# Patient Record
Sex: FEMALE | Race: WHITE | NOT HISPANIC OR LATINO | Employment: OTHER | URBAN - METROPOLITAN AREA
[De-identification: names, ages, dates, MRNs, and addresses within clinical notes are randomized per-mention and may not be internally consistent; named-entity substitution may affect disease eponyms.]

---

## 2023-09-26 PROBLEM — R60.0 BILATERAL LOWER EXTREMITY EDEMA: Status: ACTIVE | Noted: 2023-09-26

## 2023-09-26 PROBLEM — M17.0 OSTEOARTHRITIS OF BOTH KNEES: Status: ACTIVE | Noted: 2023-09-26

## 2023-09-26 PROBLEM — E78.5 HYPERLIPEMIA: Status: ACTIVE | Noted: 2023-09-26

## 2023-09-26 PROBLEM — N76.0 VAGINITIS: Status: ACTIVE | Noted: 2023-09-26

## 2023-09-26 PROBLEM — N94.9 VAGINAL BURNING: Status: ACTIVE | Noted: 2023-09-26

## 2023-09-26 PROBLEM — M62.838 MUSCLE SPASM: Status: ACTIVE | Noted: 2023-09-26

## 2023-09-26 PROBLEM — E55.9 VITAMIN D INSUFFICIENCY: Status: ACTIVE | Noted: 2023-09-26

## 2023-09-26 PROBLEM — L30.9 CHRONIC DERMATITIS: Status: ACTIVE | Noted: 2023-09-26

## 2023-09-26 PROBLEM — I87.8 CHRONIC VENOUS STASIS: Status: ACTIVE | Noted: 2023-09-26

## 2023-09-26 PROBLEM — N76.0 BACTERIAL VAGINOSIS: Status: ACTIVE | Noted: 2023-09-26

## 2023-09-26 PROBLEM — S09.90XA HEAD INJURY: Status: ACTIVE | Noted: 2023-09-26

## 2023-09-26 PROBLEM — E05.00 GRAVES DISEASE: Status: ACTIVE | Noted: 2023-09-26

## 2023-09-26 PROBLEM — I87.2 VENOUS INSUFFICIENCY: Status: ACTIVE | Noted: 2023-09-26

## 2023-09-26 PROBLEM — C50.919 BREAST CANCER (MULTI): Status: ACTIVE | Noted: 2023-09-26

## 2023-09-26 PROBLEM — R51.9 HEADACHE: Status: ACTIVE | Noted: 2023-09-26

## 2023-09-26 PROBLEM — R42 DIZZINESS: Status: ACTIVE | Noted: 2023-09-26

## 2023-09-26 PROBLEM — E66.9 OBESITY (BMI 30-39.9): Status: ACTIVE | Noted: 2023-09-26

## 2023-09-26 PROBLEM — B35.6 TINEA CRURIS: Status: ACTIVE | Noted: 2023-09-26

## 2023-09-26 PROBLEM — I87.309 CHRONIC PERIPHERAL VENOUS HYPERTENSION: Status: ACTIVE | Noted: 2023-09-26

## 2023-09-26 PROBLEM — G25.0 ESSENTIAL TREMOR: Status: ACTIVE | Noted: 2023-09-26

## 2023-09-26 PROBLEM — B96.89 BACTERIAL VAGINOSIS: Status: ACTIVE | Noted: 2023-09-26

## 2023-09-26 PROBLEM — N94.89 VAGINAL BURNING: Status: ACTIVE | Noted: 2023-09-26

## 2023-09-26 PROBLEM — N95.2 ATROPHIC VAGINITIS: Status: ACTIVE | Noted: 2023-09-26

## 2023-09-26 PROBLEM — N89.8 VAGINAL ITCHING: Status: ACTIVE | Noted: 2023-09-26
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
## 2023-09-26 PROBLEM — I10 HYPERTENSION: Status: ACTIVE | Noted: 2023-09-26

## 2023-09-26 PROBLEM — R30.0 DYSURIA: Status: ACTIVE | Noted: 2023-09-26

## 2023-09-26 PROBLEM — B37.2 YEAST DERMATITIS: Status: ACTIVE | Noted: 2023-09-26

## 2023-09-26 PROBLEM — R42 LOSS OF EQUILIBRIUM: Status: ACTIVE | Noted: 2023-09-26

## 2023-09-26 RX ORDER — METOPROLOL SUCCINATE 50 MG/1
1 TABLET, EXTENDED RELEASE ORAL DAILY
COMMUNITY
Start: 2022-01-27 | End: 2024-01-25 | Stop reason: SDUPTHER

## 2023-09-26 RX ORDER — CHOLECALCIFEROL (VITAMIN D3) 25 MCG
TABLET ORAL
COMMUNITY

## 2023-09-26 RX ORDER — LISINOPRIL 5 MG/1
1 TABLET ORAL DAILY
COMMUNITY
Start: 2022-03-06 | End: 2024-01-03 | Stop reason: SDUPTHER

## 2023-09-26 RX ORDER — SIMVASTATIN 40 MG/1
40 TABLET, FILM COATED ORAL NIGHTLY
COMMUNITY
Start: 2022-02-25 | End: 2024-03-14 | Stop reason: SDUPTHER

## 2023-09-26 RX ORDER — MELOXICAM 7.5 MG/1
7.5 TABLET ORAL
COMMUNITY
Start: 2023-01-23

## 2023-09-26 RX ORDER — OXYCODONE AND ACETAMINOPHEN 5; 325 MG/1; MG/1
1 TABLET ORAL EVERY 6 HOURS PRN
COMMUNITY
End: 2023-11-21 | Stop reason: ALTCHOICE

## 2023-10-04 ENCOUNTER — APPOINTMENT (OUTPATIENT)
Dept: PRIMARY CARE | Facility: CLINIC | Age: 83
End: 2023-10-04
Payer: MEDICARE

## 2023-11-21 ENCOUNTER — OFFICE VISIT (OUTPATIENT)
Dept: PRIMARY CARE | Facility: CLINIC | Age: 83
End: 2023-11-21
Payer: MEDICARE

## 2023-11-21 VITALS
OXYGEN SATURATION: 97 % | WEIGHT: 174.8 LBS | HEART RATE: 64 BPM | TEMPERATURE: 97.5 F | BODY MASS INDEX: 30.97 KG/M2 | SYSTOLIC BLOOD PRESSURE: 128 MMHG | HEIGHT: 63 IN | DIASTOLIC BLOOD PRESSURE: 80 MMHG

## 2023-11-21 DIAGNOSIS — M75.01 ADHESIVE CAPSULITIS OF RIGHT SHOULDER: Primary | ICD-10-CM

## 2023-11-21 DIAGNOSIS — M25.511 ACUTE PAIN OF RIGHT SHOULDER: ICD-10-CM

## 2023-11-21 PROCEDURE — 3074F SYST BP LT 130 MM HG: CPT | Performed by: INTERNAL MEDICINE

## 2023-11-21 PROCEDURE — 3079F DIAST BP 80-89 MM HG: CPT | Performed by: INTERNAL MEDICINE

## 2023-11-21 PROCEDURE — 1036F TOBACCO NON-USER: CPT | Performed by: INTERNAL MEDICINE

## 2023-11-21 PROCEDURE — 99214 OFFICE O/P EST MOD 30 MIN: CPT | Performed by: INTERNAL MEDICINE

## 2023-11-21 RX ORDER — ANASTROZOLE 1 MG/1
1 TABLET ORAL DAILY
COMMUNITY
Start: 2023-08-24

## 2023-11-21 ASSESSMENT — ENCOUNTER SYMPTOMS
DEPRESSION: 0
LOSS OF SENSATION IN FEET: 0
OCCASIONAL FEELINGS OF UNSTEADINESS: 0

## 2023-11-21 ASSESSMENT — PATIENT HEALTH QUESTIONNAIRE - PHQ9
2. FEELING DOWN, DEPRESSED OR HOPELESS: NOT AT ALL
SUM OF ALL RESPONSES TO PHQ9 QUESTIONS 1 AND 2: 0
1. LITTLE INTEREST OR PLEASURE IN DOING THINGS: NOT AT ALL

## 2023-11-24 ASSESSMENT — ENCOUNTER SYMPTOMS
JOINT SWELLING: 0
PALPITATIONS: 0
FEVER: 0
CHEST TIGHTNESS: 0
LIGHT-HEADEDNESS: 0
EYE REDNESS: 0
HEMATURIA: 0
ACTIVITY CHANGE: 0
SPEECH DIFFICULTY: 0
BLOOD IN STOOL: 0
STRIDOR: 0

## 2023-11-24 NOTE — PROGRESS NOTES
"CHIEF COMPLAINT:    Dominick Peralta is a 83 y.o. female who presents for Arm Pain (PATIENT HERE FOR C/O RIGHT ARM PAIN X 1 MONTH. ).    HISTORY OF PRESENT ILLNESS:  Dominick Peralta  is a pleasant 83-year-old lady has right shoulder pain.  She cannot do any overhead activity.  This pain has been there for some time.  Patient does not recall but she is with her  who mention that she had a fall about a month ago.  She had injury to her out stretched hand.  Thereafter patient started recalling the pain started since then.  She is not moving her right shoulder a lot.  She has tenderness.  She denies any tingling numbness sensation.  She does not have any problem elbow or wrist joint.    Arm Pain   Pertinent negatives include no chest pain.       Review of Systems   Constitutional:  Negative for activity change and fever.   HENT:  Negative for hearing loss, nosebleeds and tinnitus.    Eyes:  Negative for redness.   Respiratory:  Negative for chest tightness and stridor.    Cardiovascular:  Negative for chest pain, palpitations and leg swelling.   Gastrointestinal:  Negative for blood in stool.   Endocrine: Negative for cold intolerance.   Genitourinary:  Negative for hematuria.   Musculoskeletal:  Negative for joint swelling.   Skin:  Negative for rash.   Neurological:  Negative for speech difficulty and light-headedness.   Psychiatric/Behavioral:  Negative for behavioral problems.      Visit Vitals  /80 (BP Location: Left arm, Patient Position: Sitting, BP Cuff Size: Adult)   Pulse 64   Temp 36.4 °C (97.5 °F) (Temporal)   Ht 1.6 m (5' 3\")   Wt 79.3 kg (174 lb 12.8 oz)   SpO2 97%   BMI 30.96 kg/m²   Smoking Status Never   BSA 1.88 m²         Wt Readings from Last 10 Encounters:   11/21/23 79.3 kg (174 lb 12.8 oz)   06/30/23 81.9 kg (180 lb 8 oz)   04/28/23 80.7 kg (178 lb)   02/02/23 82.1 kg (181 lb)   01/23/23 80.3 kg (177 lb)   11/11/22 83.5 kg (184 lb)   06/22/22 86.6 kg (191 lb)   04/25/22 87.1 kg (192 " lb)   04/19/22 87.1 kg (192 lb)   04/18/22 86.8 kg (191 lb 6 oz)       Physical Exam  Constitutional:       General: She is not in acute distress.     Appearance: Normal appearance.   HENT:      Head: Normocephalic.      Right Ear: Tympanic membrane normal.      Left Ear: Tympanic membrane normal.      Mouth/Throat:      Mouth: Mucous membranes are moist.   Cardiovascular:      Rate and Rhythm: Normal rate and regular rhythm.      Heart sounds: No murmur heard.  Pulmonary:      Effort: No respiratory distress.   Abdominal:      Palpations: Abdomen is soft.   Musculoskeletal:      Cervical back: Neck supple.      Right lower leg: No edema.      Left lower leg: No edema.   Skin:     Findings: No rash.   Neurological:      General: No focal deficit present.      Mental Status: She is alert and oriented to person, place, and time.   Psychiatric:         Mood and Affect: Mood normal.        RECENT LABS:  Lab Results   Component Value Date    WBC 8.0 11/17/2021    HGB 14.0 11/17/2021    HCT 44.8 11/17/2021     11/17/2021    CHOL 163 11/17/2021    TRIG 97 11/17/2021    HDL 56.0 11/17/2021    ALT 15 11/17/2021    AST 18 11/17/2021     11/17/2021    K 4.4 11/17/2021     11/17/2021    CREATININE 0.49 (L) 11/17/2021    BUN 14 11/17/2021    CO2 31 11/17/2021    TSH 1.35 11/17/2021       MEDICATIONS:   Current Outpatient Medications   Medication Instructions    anastrozole (ARIMIDEX) 1 mg, oral, Daily    cholecalciferol (Vitamin D-3) 25 MCG (1000 UT) tablet oral    lisinopril 5 mg tablet 1 tablet, oral, Daily    meloxicam (MOBIC) 7.5 mg, oral    metoprolol succinate XL (Toprol-XL) 50 mg 24 hr tablet 1 tablet, oral, Daily    simvastatin (ZOCOR) 40 mg, oral, Nightly      TODAY'S VISIT  DX:   1. Adhesive capsulitis of right shoulder  Referral to Physical Therapy      2. Acute pain of right shoulder           MEDICAL DECISION MAKING:  - Recent lab work and relevant imaging studies have been reviewed.    - Relevant  correspondence/notes from specialty consultants were reviewed and discussed with patient.    - The current active medical co morbidities have been considered.   - Patient will continue with current medical therapy and plan.   - Medication have been sent for refill.    - Next Follow up in 3 months.

## 2024-01-03 ENCOUNTER — OFFICE VISIT (OUTPATIENT)
Dept: PRIMARY CARE | Facility: CLINIC | Age: 84
End: 2024-01-03
Payer: MEDICARE

## 2024-01-03 VITALS
BODY MASS INDEX: 31.25 KG/M2 | DIASTOLIC BLOOD PRESSURE: 66 MMHG | OXYGEN SATURATION: 97 % | HEART RATE: 76 BPM | HEIGHT: 63 IN | WEIGHT: 176.38 LBS | TEMPERATURE: 97.6 F | SYSTOLIC BLOOD PRESSURE: 140 MMHG

## 2024-01-03 DIAGNOSIS — R30.0 DYSURIA: ICD-10-CM

## 2024-01-03 DIAGNOSIS — I10 PRIMARY HYPERTENSION: ICD-10-CM

## 2024-01-03 DIAGNOSIS — C50.119 MALIGNANT NEOPLASM OF CENTRAL PORTION OF FEMALE BREAST, UNSPECIFIED ESTROGEN RECEPTOR STATUS, UNSPECIFIED LATERALITY (MULTI): ICD-10-CM

## 2024-01-03 DIAGNOSIS — N39.490 OVERFLOW INCONTINENCE OF URINE: Primary | ICD-10-CM

## 2024-01-03 PROBLEM — I87.8 CHRONIC VENOUS STASIS: Status: RESOLVED | Noted: 2023-09-26 | Resolved: 2024-01-03
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
## 2024-01-03 PROBLEM — I87.2 VENOUS INSUFFICIENCY: Status: RESOLVED | Noted: 2023-09-26 | Resolved: 2024-01-03

## 2024-01-03 PROBLEM — B37.2 YEAST DERMATITIS: Status: RESOLVED | Noted: 2023-09-26 | Resolved: 2024-01-03

## 2024-01-03 PROBLEM — R42 DIZZINESS: Status: RESOLVED | Noted: 2023-09-26 | Resolved: 2024-01-03

## 2024-01-03 PROBLEM — S09.90XA HEAD INJURY: Status: RESOLVED | Noted: 2023-09-26 | Resolved: 2024-01-03

## 2024-01-03 PROBLEM — R51.9 HEADACHE: Status: RESOLVED | Noted: 2023-09-26 | Resolved: 2024-01-03

## 2024-01-03 PROBLEM — N94.89 VAGINAL BURNING: Status: RESOLVED | Noted: 2023-09-26 | Resolved: 2024-01-03

## 2024-01-03 PROBLEM — N94.9 VAGINAL BURNING: Status: RESOLVED | Noted: 2023-09-26 | Resolved: 2024-01-03

## 2024-01-03 PROBLEM — B35.6 TINEA CRURIS: Status: RESOLVED | Noted: 2023-09-26 | Resolved: 2024-01-03

## 2024-01-03 PROBLEM — N76.0 VAGINITIS: Status: RESOLVED | Noted: 2023-09-26 | Resolved: 2024-01-03

## 2024-01-03 PROBLEM — N76.0 BACTERIAL VAGINOSIS: Status: RESOLVED | Noted: 2023-09-26 | Resolved: 2024-01-03

## 2024-01-03 PROBLEM — R42 LOSS OF EQUILIBRIUM: Status: RESOLVED | Noted: 2023-09-26 | Resolved: 2024-01-03

## 2024-01-03 PROBLEM — L30.9 CHRONIC DERMATITIS: Status: RESOLVED | Noted: 2023-09-26 | Resolved: 2024-01-03

## 2024-01-03 PROBLEM — B96.89 BACTERIAL VAGINOSIS: Status: RESOLVED | Noted: 2023-09-26 | Resolved: 2024-01-03

## 2024-01-03 PROBLEM — M62.838 MUSCLE SPASM: Status: RESOLVED | Noted: 2023-09-26 | Resolved: 2024-01-03

## 2024-01-03 PROBLEM — N89.8 VAGINAL ITCHING: Status: RESOLVED | Noted: 2023-09-26 | Resolved: 2024-01-03

## 2024-01-03 PROCEDURE — 3078F DIAST BP <80 MM HG: CPT | Performed by: INTERNAL MEDICINE

## 2024-01-03 PROCEDURE — 1159F MED LIST DOCD IN RCRD: CPT | Performed by: INTERNAL MEDICINE

## 2024-01-03 PROCEDURE — 1036F TOBACCO NON-USER: CPT | Performed by: INTERNAL MEDICINE

## 2024-01-03 PROCEDURE — 99214 OFFICE O/P EST MOD 30 MIN: CPT | Performed by: INTERNAL MEDICINE

## 2024-01-03 PROCEDURE — 3077F SYST BP >= 140 MM HG: CPT | Performed by: INTERNAL MEDICINE

## 2024-01-03 RX ORDER — LISINOPRIL 5 MG/1
5 TABLET ORAL DAILY
Qty: 90 TABLET | Refills: 1 | Status: SHIPPED | OUTPATIENT
Start: 2024-01-03

## 2024-01-03 RX ORDER — MIRABEGRON 50 MG/1
50 TABLET, EXTENDED RELEASE ORAL DAILY
Start: 2024-01-03 | End: 2024-01-30 | Stop reason: SDUPTHER

## 2024-01-03 ASSESSMENT — ENCOUNTER SYMPTOMS
CHEST TIGHTNESS: 0
JOINT SWELLING: 0
EYE REDNESS: 0
ACTIVITY CHANGE: 0
BLOOD IN STOOL: 0
LIGHT-HEADEDNESS: 0
FEVER: 0
PALPITATIONS: 0
HEMATURIA: 0
STRIDOR: 0
SPEECH DIFFICULTY: 0

## 2024-01-03 ASSESSMENT — PATIENT HEALTH QUESTIONNAIRE - PHQ9: 1. LITTLE INTEREST OR PLEASURE IN DOING THINGS: NOT AT ALL

## 2024-01-04 NOTE — PROGRESS NOTES
"CHIEF COMPLAINT:    Dominick Peralta is a 83 y.o. female who presents for bladder control (Patient in office today for bladder control issues. ).    HISTORY OF PRESENT ILLNESS:  Dominick Peralta  is a pleasant 83-year-old female comes here with her .  She has been having incontinence.  She needs to go to the bathroom right away otherwise she has accidents.  This has been going on for some time.  Patient does have history of atrophic vaginitis with vaginal itchiness and burning sensation.  She is a breast cancer survivor.  She also suffers from hypertension.  Patient denies any chills and rigors.  She does not have any nausea or vomiting.  She does not think this is any UTI.  She does not use adult diapers as such however quite a few times she had accidents..        Review of Systems   Constitutional:  Negative for activity change and fever.   HENT:  Negative for hearing loss, nosebleeds and tinnitus.    Eyes:  Negative for redness.   Respiratory:  Negative for chest tightness and stridor.    Cardiovascular:  Negative for chest pain, palpitations and leg swelling.   Gastrointestinal:  Negative for blood in stool.   Endocrine: Negative for cold intolerance.   Genitourinary:  Negative for hematuria.   Musculoskeletal:  Negative for joint swelling.   Skin:  Negative for rash.   Neurological:  Negative for speech difficulty and light-headedness.   Psychiatric/Behavioral:  Negative for behavioral problems.      Visit Vitals  /66 (BP Location: Left arm, Patient Position: Sitting, BP Cuff Size: Adult)   Pulse 76   Temp 36.4 °C (97.6 °F) (Temporal)   Ht 1.6 m (5' 3\")   Wt 80 kg (176 lb 6 oz)   SpO2 97%   BMI 31.24 kg/m²   Smoking Status Never   BSA 1.89 m²         Wt Readings from Last 10 Encounters:   01/03/24 80 kg (176 lb 6 oz)   11/21/23 79.3 kg (174 lb 12.8 oz)   06/30/23 81.9 kg (180 lb 8 oz)   04/28/23 80.7 kg (178 lb)   02/02/23 82.1 kg (181 lb)   01/23/23 80.3 kg (177 lb)   11/11/22 83.5 kg (184 lb) "   06/22/22 86.6 kg (191 lb)   04/25/22 87.1 kg (192 lb)   04/19/22 87.1 kg (192 lb)       Physical Exam  Constitutional:       General: She is not in acute distress.     Appearance: Normal appearance.   HENT:      Head: Normocephalic.      Right Ear: Tympanic membrane normal.      Left Ear: Tympanic membrane normal.      Mouth/Throat:      Mouth: Mucous membranes are moist.   Cardiovascular:      Rate and Rhythm: Normal rate and regular rhythm.      Heart sounds: No murmur heard.  Pulmonary:      Effort: No respiratory distress.   Abdominal:      Palpations: Abdomen is soft.   Musculoskeletal:      Cervical back: Neck supple.      Right lower leg: No edema.      Left lower leg: No edema.   Skin:     Findings: No rash.   Neurological:      General: No focal deficit present.      Mental Status: She is alert and oriented to person, place, and time.   Psychiatric:         Mood and Affect: Mood normal.        RECENT LABS:  Lab Results   Component Value Date    WBC 8.0 11/17/2021    HGB 14.0 11/17/2021    HCT 44.8 11/17/2021     11/17/2021    CHOL 163 11/17/2021    TRIG 97 11/17/2021    HDL 56.0 11/17/2021    ALT 15 11/17/2021    AST 18 11/17/2021     11/17/2021    K 4.4 11/17/2021     11/17/2021    CREATININE 0.49 (L) 11/17/2021    BUN 14 11/17/2021    CO2 31 11/17/2021    TSH 1.35 11/17/2021     IMAGING:  Reviewed:   MEDICATIONS:   Current Outpatient Medications   Medication Instructions    anastrozole (ARIMIDEX) 1 mg, oral, Daily    cholecalciferol (Vitamin D-3) 25 MCG (1000 UT) tablet oral    lisinopril 5 mg, oral, Daily    meloxicam (MOBIC) 7.5 mg, oral    metoprolol succinate XL (Toprol-XL) 50 mg 24 hr tablet 1 tablet, oral, Daily    simvastatin (ZOCOR) 40 mg, oral, Nightly      TODAY'S VISIT  DX:   1. Overflow incontinence of urine  mirabegron (Myrbetriq) 50 mg tablet extended release 24 hr 24 hr tablet      2. Primary hypertension  lisinopril 5 mg tablet      3. Malignant neoplasm of central  portion of female breast, unspecified estrogen receptor status, unspecified laterality (CMS/HCC)        4. Dysuria           MEDICAL DECISION MAKING:  - Recent lab work and relevant imaging studies have been reviewed.    - The current active medical co morbidities have been considered.   - Relevant correspondence/notes from specialty consultants were reviewed and discussed with patient.    - Patient was given treatment as per above plan.  Patient was given some physician samples of Marbetriq.   - Patient will continue with current medical therapy and plan.   - Medication have been sent for refill.    - Next Follow up 1 to 2 months.

## 2024-01-25 DIAGNOSIS — I10 HYPERTENSION, UNSPECIFIED TYPE: ICD-10-CM

## 2024-01-28 RX ORDER — METOPROLOL SUCCINATE 50 MG/1
50 TABLET, EXTENDED RELEASE ORAL DAILY
Qty: 90 TABLET | Refills: 1 | Status: SHIPPED | OUTPATIENT
Start: 2024-01-28

## 2024-01-30 ENCOUNTER — OFFICE VISIT (OUTPATIENT)
Dept: PRIMARY CARE | Facility: CLINIC | Age: 84
End: 2024-01-30
Payer: MEDICARE

## 2024-01-30 VITALS
OXYGEN SATURATION: 97 % | WEIGHT: 177.8 LBS | TEMPERATURE: 97.2 F | HEIGHT: 63 IN | BODY MASS INDEX: 31.5 KG/M2 | HEART RATE: 73 BPM | DIASTOLIC BLOOD PRESSURE: 60 MMHG | SYSTOLIC BLOOD PRESSURE: 110 MMHG

## 2024-01-30 DIAGNOSIS — H35.3213 EXUDATIVE AGE-RELATED MACULAR DEGENERATION, RIGHT EYE, WITH INACTIVE SCAR (MULTI): ICD-10-CM

## 2024-01-30 DIAGNOSIS — N39.490 OVERFLOW INCONTINENCE OF URINE: ICD-10-CM

## 2024-01-30 DIAGNOSIS — N95.2 ATROPHIC VAGINITIS: ICD-10-CM

## 2024-01-30 DIAGNOSIS — N39.498 FREQUENT URINARY INCONTINENCE: Primary | ICD-10-CM

## 2024-01-30 PROBLEM — R60.0 BILATERAL LOWER EXTREMITY EDEMA: Status: RESOLVED | Noted: 2023-09-26 | Resolved: 2024-01-30

## 2024-01-30 PROBLEM — E05.00 GRAVES DISEASE: Status: RESOLVED | Noted: 2023-09-26 | Resolved: 2024-01-30

## 2024-01-30 PROBLEM — R30.0 DYSURIA: Status: RESOLVED | Noted: 2023-09-26 | Resolved: 2024-01-30

## 2024-01-30 PROCEDURE — 99214 OFFICE O/P EST MOD 30 MIN: CPT | Performed by: INTERNAL MEDICINE

## 2024-01-30 PROCEDURE — 1036F TOBACCO NON-USER: CPT | Performed by: INTERNAL MEDICINE

## 2024-01-30 PROCEDURE — 1159F MED LIST DOCD IN RCRD: CPT | Performed by: INTERNAL MEDICINE

## 2024-01-30 PROCEDURE — 3074F SYST BP LT 130 MM HG: CPT | Performed by: INTERNAL MEDICINE

## 2024-01-30 PROCEDURE — 3078F DIAST BP <80 MM HG: CPT | Performed by: INTERNAL MEDICINE

## 2024-01-30 RX ORDER — MIRABEGRON 50 MG/1
50 TABLET, EXTENDED RELEASE ORAL DAILY
Qty: 90 TABLET | Refills: 1 | Status: SHIPPED | OUTPATIENT
Start: 2024-01-30 | End: 2025-01-29

## 2024-01-30 ASSESSMENT — ENCOUNTER SYMPTOMS
DEPRESSION: 0
OCCASIONAL FEELINGS OF UNSTEADINESS: 0
LOSS OF SENSATION IN FEET: 0

## 2024-01-30 ASSESSMENT — PATIENT HEALTH QUESTIONNAIRE - PHQ9
SUM OF ALL RESPONSES TO PHQ9 QUESTIONS 1 AND 2: 0
2. FEELING DOWN, DEPRESSED OR HOPELESS: NOT AT ALL
1. LITTLE INTEREST OR PLEASURE IN DOING THINGS: NOT AT ALL

## 2024-02-12 DIAGNOSIS — B37.2 CUTANEOUS CANDIDIASIS: ICD-10-CM

## 2024-02-12 DIAGNOSIS — L03.317 CELLULITIS OF MULTIPLE SITES OF BUTTOCK: Primary | ICD-10-CM

## 2024-02-12 RX ORDER — CLOTRIMAZOLE AND BETAMETHASONE DIPROPIONATE 10; .64 MG/G; MG/G
1 CREAM TOPICAL 4 TIMES DAILY
COMMUNITY
End: 2024-02-12 | Stop reason: SDUPTHER

## 2024-02-12 RX ORDER — CLOTRIMAZOLE AND BETAMETHASONE DIPROPIONATE 10; .64 MG/G; MG/G
CREAM TOPICAL 4 TIMES DAILY
Qty: 45 G | Refills: 1 | Status: SHIPPED | OUTPATIENT
Start: 2024-02-12 | End: 2024-04-24 | Stop reason: SDUPTHER

## 2024-02-12 NOTE — TELEPHONE ENCOUNTER
stated that the co-pay on the [facility] is $283.  is asking if something else could be sent. Please advise.

## 2024-02-14 ASSESSMENT — ENCOUNTER SYMPTOMS
STRIDOR: 0
SPEECH DIFFICULTY: 0
JOINT SWELLING: 0
PALPITATIONS: 0
ACTIVITY CHANGE: 0
BLOOD IN STOOL: 0
LIGHT-HEADEDNESS: 0
EYE REDNESS: 0
HEMATURIA: 0
FEVER: 0
CHEST TIGHTNESS: 0

## 2024-02-14 NOTE — PROGRESS NOTES
"CHIEF COMPLAINT:    Dominick Peralta is a 83 y.o. female who presents for Med Management (Patient is in office today to go over medications).    HISTORY OF PRESENT ILLNESS:  Dominick Peralta  is a pleasant 83-year-old female who has urinary incontinence.  We had some physician samples for mirabegron.  Patient is here today in the office with her .  She got positive result on this medication.  She needs prescription.  Patient also has dry vaginal skin, irritation and also itching sensation.  She denies any dysuria or hesitancy.  Otherwise she does not have any acute medical complaint.  Her chronic medical conditions are stable at baseline.  She is a breast cancer survivor        Review of Systems   Constitutional:  Negative for activity change and fever.   HENT:  Negative for hearing loss, nosebleeds and tinnitus.    Eyes:  Negative for redness.   Respiratory:  Negative for chest tightness and stridor.    Cardiovascular:  Negative for chest pain, palpitations and leg swelling.   Gastrointestinal:  Negative for blood in stool.   Endocrine: Negative for cold intolerance.   Genitourinary:  Negative for hematuria.   Musculoskeletal:  Negative for joint swelling.   Skin:  Negative for rash.   Neurological:  Negative for speech difficulty and light-headedness.   Psychiatric/Behavioral:  Negative for behavioral problems.      Visit Vitals  /60 (BP Location: Left arm, Patient Position: Sitting, BP Cuff Size: Large adult)   Pulse 73   Temp 36.2 °C (97.2 °F) (Temporal)   Ht 1.6 m (5' 3\")   Wt 80.6 kg (177 lb 12.8 oz)   SpO2 97% Comment: RA   BMI 31.50 kg/m²   Smoking Status Never   BSA 1.89 m²         Wt Readings from Last 10 Encounters:   01/30/24 80.6 kg (177 lb 12.8 oz)   01/03/24 80 kg (176 lb 6 oz)   11/21/23 79.3 kg (174 lb 12.8 oz)   06/30/23 81.9 kg (180 lb 8 oz)   04/28/23 80.7 kg (178 lb)   02/02/23 82.1 kg (181 lb)   01/23/23 80.3 kg (177 lb)   11/11/22 83.5 kg (184 lb)   06/22/22 86.6 kg (191 lb) "   04/25/22 87.1 kg (192 lb)       Physical Exam  Constitutional:       General: She is not in acute distress.     Appearance: Normal appearance.   HENT:      Head: Normocephalic.      Right Ear: Tympanic membrane normal.      Left Ear: Tympanic membrane normal.      Mouth/Throat:      Mouth: Mucous membranes are moist.   Cardiovascular:      Rate and Rhythm: Normal rate and regular rhythm.      Heart sounds: No murmur heard.  Pulmonary:      Effort: No respiratory distress.   Abdominal:      Palpations: Abdomen is soft.   Musculoskeletal:      Cervical back: Neck supple.      Right lower leg: No edema.      Left lower leg: No edema.   Skin:     Findings: No rash.   Neurological:      General: No focal deficit present.      Mental Status: She is alert and oriented to person, place, and time.   Psychiatric:         Mood and Affect: Mood normal.        RECENT LABS:  Lab Results   Component Value Date    WBC 8.0 11/17/2021    HGB 14.0 11/17/2021    HCT 44.8 11/17/2021     11/17/2021    CHOL 163 11/17/2021    TRIG 97 11/17/2021    HDL 56.0 11/17/2021    ALT 15 11/17/2021    AST 18 11/17/2021     11/17/2021    K 4.4 11/17/2021     11/17/2021    CREATININE 0.49 (L) 11/17/2021    BUN 14 11/17/2021    CO2 31 11/17/2021    TSH 1.35 11/17/2021     IMAGING:  === 02/10/20 ===    - Impression -  1. No evidence of acute cardiopulmonary process.   === 01/18/21 ===    CT HEAD WO IV CONTRAST    - Impression -  No definite acute cortical infarct or intracranial hemorrhage.  Mild nonspecific deep and subcortical white matter disease.  Sinus disease as above.     MEDICATIONS:   Current Outpatient Medications   Medication Instructions    anastrozole (ARIMIDEX) 1 mg, oral, Daily    cholecalciferol (Vitamin D-3) 25 MCG (1000 UT) tablet oral    clotrimazole-betamethasone (Lotrisone) cream Topical, 4 times daily    estrogens (conjugated) (PREMARIN) 1 g, vaginal, Daily    lisinopril 5 mg, oral, Daily    meloxicam (MOBIC)  7.5 mg, oral    metoprolol succinate XL (TOPROL-XL) 50 mg, oral, Daily    mirabegron (MYRBETRIQ) 50 mg, oral, Daily    simvastatin (ZOCOR) 40 mg, oral, Nightly      TODAY'S VISIT  DX:   1. Frequent urinary incontinence        2. Exudative age-related macular degeneration, right eye, with inactive scar (CMS/HCC)        3. Overflow incontinence of urine  mirabegron (Myrbetriq) 50 mg tablet extended release 24 hr 24 hr tablet      4. Atrophic vaginitis  estrogens, conjugated, (Premarin) vaginal cream         MEDICAL DECISION MAKING:  - Recent lab work and relevant imaging studies have been reviewed.    - The current active medical co morbidities have been considered.   - Relevant correspondence/notes from specialty consultants were reviewed and discussed with patient.    - Patient was given treatment as per above plan.   - Patient will continue with current medical therapy and plan.   - Medication have been sent for refill.    - Next Follow up in 3 months.

## 2024-03-14 DIAGNOSIS — E78.5 HYPERLIPIDEMIA, UNSPECIFIED HYPERLIPIDEMIA TYPE: ICD-10-CM

## 2024-03-14 RX ORDER — SIMVASTATIN 40 MG/1
40 TABLET, FILM COATED ORAL NIGHTLY
Qty: 90 TABLET | Refills: 1 | Status: SHIPPED | OUTPATIENT
Start: 2024-03-14

## 2024-04-24 ENCOUNTER — OFFICE VISIT (OUTPATIENT)
Dept: PRIMARY CARE | Facility: CLINIC | Age: 84
End: 2024-04-24
Payer: MEDICARE

## 2024-04-24 ENCOUNTER — APPOINTMENT (OUTPATIENT)
Dept: PRIMARY CARE | Facility: CLINIC | Age: 84
End: 2024-04-24
Payer: MEDICARE

## 2024-04-24 VITALS
BODY MASS INDEX: 32.62 KG/M2 | TEMPERATURE: 97.7 F | DIASTOLIC BLOOD PRESSURE: 66 MMHG | HEIGHT: 63 IN | WEIGHT: 184.13 LBS | HEART RATE: 73 BPM | OXYGEN SATURATION: 98 % | SYSTOLIC BLOOD PRESSURE: 120 MMHG

## 2024-04-24 DIAGNOSIS — Z00.00 ENCOUNTER FOR SUBSEQUENT ANNUAL WELLNESS VISIT (AWV) IN MEDICARE PATIENT: Primary | ICD-10-CM

## 2024-04-24 DIAGNOSIS — B37.2 CUTANEOUS CANDIDIASIS: ICD-10-CM

## 2024-04-24 DIAGNOSIS — E55.9 VITAMIN D INSUFFICIENCY: ICD-10-CM

## 2024-04-24 DIAGNOSIS — E78.2 MIXED DYSLIPIDEMIA: ICD-10-CM

## 2024-04-24 DIAGNOSIS — I10 ESSENTIAL (PRIMARY) HYPERTENSION: ICD-10-CM

## 2024-04-24 PROCEDURE — 1170F FXNL STATUS ASSESSED: CPT | Performed by: INTERNAL MEDICINE

## 2024-04-24 PROCEDURE — 3078F DIAST BP <80 MM HG: CPT | Performed by: INTERNAL MEDICINE

## 2024-04-24 PROCEDURE — 3074F SYST BP LT 130 MM HG: CPT | Performed by: INTERNAL MEDICINE

## 2024-04-24 PROCEDURE — 1158F ADVNC CARE PLAN TLK DOCD: CPT | Performed by: INTERNAL MEDICINE

## 2024-04-24 PROCEDURE — G0439 PPPS, SUBSEQ VISIT: HCPCS | Performed by: INTERNAL MEDICINE

## 2024-04-24 PROCEDURE — 1159F MED LIST DOCD IN RCRD: CPT | Performed by: INTERNAL MEDICINE

## 2024-04-24 PROCEDURE — 1123F ACP DISCUSS/DSCN MKR DOCD: CPT | Performed by: INTERNAL MEDICINE

## 2024-04-24 ASSESSMENT — ACTIVITIES OF DAILY LIVING (ADL)
DOING_HOUSEWORK: INDEPENDENT
BATHING: INDEPENDENT
TAKING_MEDICATION: INDEPENDENT
MANAGING_FINANCES: INDEPENDENT
DRESSING: INDEPENDENT
GROCERY_SHOPPING: INDEPENDENT

## 2024-04-24 ASSESSMENT — ENCOUNTER SYMPTOMS
OCCASIONAL FEELINGS OF UNSTEADINESS: 0
DEPRESSION: 0
LOSS OF SENSATION IN FEET: 1

## 2024-04-24 ASSESSMENT — PATIENT HEALTH QUESTIONNAIRE - PHQ9
1. LITTLE INTEREST OR PLEASURE IN DOING THINGS: NOT AT ALL
SUM OF ALL RESPONSES TO PHQ9 QUESTIONS 1 AND 2: 0
2. FEELING DOWN, DEPRESSED OR HOPELESS: NOT AT ALL

## 2024-05-07 ENCOUNTER — LAB (OUTPATIENT)
Dept: LAB | Facility: LAB | Age: 84
End: 2024-05-07
Payer: MEDICARE

## 2024-05-07 DIAGNOSIS — E55.9 VITAMIN D INSUFFICIENCY: ICD-10-CM

## 2024-05-07 DIAGNOSIS — R39.9 URINARY TRACT INFECTION SYMPTOMS: ICD-10-CM

## 2024-05-07 DIAGNOSIS — Z00.00 ENCOUNTER FOR WELLNESS EXAMINATION: ICD-10-CM

## 2024-05-07 DIAGNOSIS — E78.2 MIXED DYSLIPIDEMIA: ICD-10-CM

## 2024-05-07 DIAGNOSIS — I10 ESSENTIAL (PRIMARY) HYPERTENSION: ICD-10-CM

## 2024-05-07 LAB
25(OH)D3 SERPL-MCNC: 62 NG/ML (ref 30–100)
ALBUMIN SERPL BCP-MCNC: 4.2 G/DL (ref 3.4–5)
ALP SERPL-CCNC: 64 U/L (ref 33–136)
ALT SERPL W P-5'-P-CCNC: 13 U/L (ref 7–45)
AMORPH CRY #/AREA UR COMP ASSIST: ABNORMAL /HPF
ANION GAP SERPL CALC-SCNC: 9 MMOL/L (ref 10–20)
APPEARANCE UR: ABNORMAL
AST SERPL W P-5'-P-CCNC: 17 U/L (ref 9–39)
BACTERIA #/AREA URNS AUTO: ABNORMAL /HPF
BASOPHILS # BLD AUTO: 0.09 X10*3/UL (ref 0–0.1)
BASOPHILS NFR BLD AUTO: 1.2 %
BILIRUB SERPL-MCNC: 0.6 MG/DL (ref 0–1.2)
BILIRUB UR STRIP.AUTO-MCNC: NEGATIVE MG/DL
BUN SERPL-MCNC: 17 MG/DL (ref 6–23)
CALCIUM SERPL-MCNC: 9.7 MG/DL (ref 8.6–10.3)
CHLORIDE SERPL-SCNC: 107 MMOL/L (ref 98–107)
CHOLEST SERPL-MCNC: 175 MG/DL (ref 0–199)
CHOLESTEROL/HDL RATIO: 2.8
CO2 SERPL-SCNC: 28 MMOL/L (ref 21–32)
COLOR UR: YELLOW
CREAT SERPL-MCNC: 0.52 MG/DL (ref 0.5–1.05)
EGFRCR SERPLBLD CKD-EPI 2021: >90 ML/MIN/1.73M*2
EOSINOPHIL # BLD AUTO: 0.19 X10*3/UL (ref 0–0.4)
EOSINOPHIL NFR BLD AUTO: 2.6 %
ERYTHROCYTE [DISTWIDTH] IN BLOOD BY AUTOMATED COUNT: 14.6 % (ref 11.5–14.5)
GLUCOSE SERPL-MCNC: 91 MG/DL (ref 74–99)
GLUCOSE UR STRIP.AUTO-MCNC: NEGATIVE MG/DL
HCT VFR BLD AUTO: 40.8 % (ref 36–46)
HDLC SERPL-MCNC: 62.2 MG/DL
HGB BLD-MCNC: 13.2 G/DL (ref 12–16)
IMM GRANULOCYTES # BLD AUTO: 0.02 X10*3/UL (ref 0–0.5)
IMM GRANULOCYTES NFR BLD AUTO: 0.3 % (ref 0–0.9)
KETONES UR STRIP.AUTO-MCNC: NEGATIVE MG/DL
LDLC SERPL CALC-MCNC: 96 MG/DL
LEUKOCYTE ESTERASE UR QL STRIP.AUTO: ABNORMAL
LYMPHOCYTES # BLD AUTO: 2.35 X10*3/UL (ref 0.8–3)
LYMPHOCYTES NFR BLD AUTO: 32 %
MCH RBC QN AUTO: 28.3 PG (ref 26–34)
MCHC RBC AUTO-ENTMCNC: 32.4 G/DL (ref 32–36)
MCV RBC AUTO: 87 FL (ref 80–100)
MONOCYTES # BLD AUTO: 0.72 X10*3/UL (ref 0.05–0.8)
MONOCYTES NFR BLD AUTO: 9.8 %
MUCOUS THREADS #/AREA URNS AUTO: ABNORMAL /LPF
NEUTROPHILS # BLD AUTO: 3.97 X10*3/UL (ref 1.6–5.5)
NEUTROPHILS NFR BLD AUTO: 54.1 %
NITRITE UR QL STRIP.AUTO: NEGATIVE
NON HDL CHOLESTEROL: 113 MG/DL (ref 0–149)
NRBC BLD-RTO: 0 /100 WBCS (ref 0–0)
PH UR STRIP.AUTO: 7 [PH]
PLATELET # BLD AUTO: 236 X10*3/UL (ref 150–450)
POTASSIUM SERPL-SCNC: 4.4 MMOL/L (ref 3.5–5.3)
PROT SERPL-MCNC: 7.1 G/DL (ref 6.4–8.2)
PROT UR STRIP.AUTO-MCNC: NEGATIVE MG/DL
RBC # BLD AUTO: 4.67 X10*6/UL (ref 4–5.2)
RBC # UR STRIP.AUTO: NEGATIVE /UL
RBC #/AREA URNS AUTO: ABNORMAL /HPF
SODIUM SERPL-SCNC: 140 MMOL/L (ref 136–145)
SP GR UR STRIP.AUTO: 1.01
TRIGL SERPL-MCNC: 86 MG/DL (ref 0–149)
TSH SERPL-ACNC: 1.44 MIU/L (ref 0.44–3.98)
UROBILINOGEN UR STRIP.AUTO-MCNC: <2 MG/DL
VLDL: 17 MG/DL (ref 0–40)
WBC # BLD AUTO: 7.3 X10*3/UL (ref 4.4–11.3)
WBC #/AREA URNS AUTO: ABNORMAL /HPF

## 2024-05-07 PROCEDURE — 82306 VITAMIN D 25 HYDROXY: CPT

## 2024-05-07 PROCEDURE — 80061 LIPID PANEL: CPT

## 2024-05-07 PROCEDURE — 81001 URINALYSIS AUTO W/SCOPE: CPT

## 2024-05-07 PROCEDURE — 80053 COMPREHEN METABOLIC PANEL: CPT

## 2024-05-07 PROCEDURE — 85025 COMPLETE CBC W/AUTO DIFF WBC: CPT

## 2024-05-07 PROCEDURE — 36415 COLL VENOUS BLD VENIPUNCTURE: CPT

## 2024-05-07 PROCEDURE — 84443 ASSAY THYROID STIM HORMONE: CPT

## 2024-05-07 RX ORDER — CLOTRIMAZOLE AND BETAMETHASONE DIPROPIONATE 10; .64 MG/G; MG/G
CREAM TOPICAL 4 TIMES DAILY
Qty: 45 G | Refills: 3 | Status: SHIPPED | OUTPATIENT
Start: 2024-05-07 | End: 2025-05-07

## 2024-05-16 ASSESSMENT — ENCOUNTER SYMPTOMS
HEMATURIA: 0
FEVER: 0
EYE REDNESS: 0
PALPITATIONS: 0
LIGHT-HEADEDNESS: 0
STRIDOR: 0
SPEECH DIFFICULTY: 0
CHEST TIGHTNESS: 0
BLOOD IN STOOL: 0
JOINT SWELLING: 0
ACTIVITY CHANGE: 0

## 2024-05-16 NOTE — PROGRESS NOTES
"Dominick Peralta is otherwise doing well. Chronic medical conditions are stable with current medical regimen. Cognitive functions are intact and stable. Immunizations are age appropriately up-to-date. Today patient does not have any acute medical complaint. We reconciled home medications. . Discussed about the preventative layla like cancer screening, routine blood work, immunizations. The healthy lifestyle has been reinforced. Encouraged continued avoidance of tobacco alcohol substances of abuse. Functional capacity has been assessed. The depression screening questionnaire has been reviewed. Discussed safety measures and advanced directives, Med refills were sent.  Vital signs reviewed.  The due lab orders, if any were provided.  All the other components of annual wellness has been further narrated below. Patient will return for follow up as per schedule.       Review of Systems   Constitutional:  Negative for activity change and fever.   HENT:  Negative for hearing loss, nosebleeds and tinnitus.    Eyes:  Negative for redness.   Respiratory:  Negative for chest tightness and stridor.    Cardiovascular:  Negative for chest pain, palpitations and leg swelling.   Gastrointestinal:  Negative for blood in stool.   Endocrine: Negative for cold intolerance.   Genitourinary:  Negative for hematuria.   Musculoskeletal:  Negative for joint swelling.   Skin:  Negative for rash.   Neurological:  Negative for speech difficulty and light-headedness.   Psychiatric/Behavioral:  Negative for behavioral problems.        Visit Vitals  /66 (BP Location: Left arm, Patient Position: Sitting, BP Cuff Size: Adult)   Pulse 73   Temp 36.5 °C (97.7 °F) (Temporal)   Ht 1.6 m (5' 3\")   Wt 83.5 kg (184 lb 2 oz)   SpO2 98%   BMI 32.62 kg/m²   Smoking Status Never   BSA 1.93 m²           Wt Readings from Last 10 Encounters:   04/24/24 83.5 kg (184 lb 2 oz)   01/30/24 80.6 kg (177 lb 12.8 oz)   01/03/24 80 kg (176 lb 6 oz)   11/21/23 79.3 kg " (174 lb 12.8 oz)   06/30/23 81.9 kg (180 lb 8 oz)   04/28/23 80.7 kg (178 lb)   02/02/23 82.1 kg (181 lb)   01/23/23 80.3 kg (177 lb)   11/11/22 83.5 kg (184 lb)   06/22/22 86.6 kg (191 lb)        Physical Exam  Constitutional:       General: She is not in acute distress.     Appearance: Normal appearance.   HENT:      Head: Normocephalic.      Right Ear: Tympanic membrane normal.      Left Ear: Tympanic membrane normal.      Mouth/Throat:      Mouth: Mucous membranes are moist.   Cardiovascular:      Rate and Rhythm: Normal rate and regular rhythm.      Heart sounds: No murmur heard.  Pulmonary:      Effort: No respiratory distress.   Abdominal:      Palpations: Abdomen is soft.   Musculoskeletal:      Cervical back: Neck supple.      Right lower leg: No edema.      Left lower leg: No edema.   Skin:     Findings: No rash.   Neurological:      General: No focal deficit present.      Mental Status: She is alert and oriented to person, place, and time.   Psychiatric:         Mood and Affect: Mood normal.            RECENT LABS:  Lab Results   Component Value Date    WBC 7.3 05/07/2024    HGB 13.2 05/07/2024    HCT 40.8 05/07/2024     05/07/2024    CHOL 175 05/07/2024    TRIG 86 05/07/2024    HDL 62.2 05/07/2024    ALT 13 05/07/2024    AST 17 05/07/2024     05/07/2024    K 4.4 05/07/2024     05/07/2024    CREATININE 0.52 05/07/2024    BUN 17 05/07/2024    CO2 28 05/07/2024    TSH 1.44 05/07/2024       IMAGING:  No results found.     MEDICATIONS:   Current Outpatient Medications   Medication Instructions    anastrozole (ARIMIDEX) 1 mg, oral, Daily    cholecalciferol (Vitamin D-3) 25 MCG (1000 UT) tablet oral    clotrimazole-betamethasone (Lotrisone) cream Topical, 4 times daily    lisinopril 5 mg, oral, Daily    meloxicam (MOBIC) 7.5 mg, oral    metoprolol succinate XL (TOPROL-XL) 50 mg, oral, Daily    mirabegron (MYRBETRIQ) 50 mg, oral, Daily    simvastatin (ZOCOR) 40 mg, oral, Nightly        TODAY'S  VISIT  DX:   1. Encounter for subsequent annual wellness visit (AWV) in Medicare patient  Comprehensive Metabolic Panel    TSH with reflex to Free T4 if abnormal    Urinalysis with Reflex Microscopic      2. Cutaneous candidiasis  clotrimazole-betamethasone (Lotrisone) cream      3. Essential (primary) hypertension  CBC and Auto Differential      4. Mixed dyslipidemia  Lipid Panel      5. Vitamin D insufficiency  Vitamin D 25-Hydroxy,Total (for eval of Vitamin D levels)           MEDICAL DECISION MAKING:   Recent lab work and relevant imaging studies have been reviewed.  Relevant correspondence/notes from specialty consultants were reviewed and discussed with patient.  The current active medical co morbidities have been considered.  Patient's cancer screening tests are up-to-date.  Medication have been sent for refill.  Patient will continue with current medical therapy and plan.  Immunizations are up-to-date.  Relevant orders are in the chart for this visit.  I will see patient back in 3 months.      PS: This note was completed using Dragon voice recognition technology and may include unintended errors with respect to translation of words, typographical errors or grammar errors which may not have been identified while finalizing the chart.

## 2024-07-23 DIAGNOSIS — I10 HYPERTENSION, UNSPECIFIED TYPE: ICD-10-CM

## 2024-07-23 RX ORDER — METOPROLOL SUCCINATE 50 MG/1
50 TABLET, EXTENDED RELEASE ORAL DAILY
Qty: 90 TABLET | Refills: 2 | Status: SHIPPED | OUTPATIENT
Start: 2024-07-23

## 2024-09-19 DIAGNOSIS — E78.5 HYPERLIPIDEMIA, UNSPECIFIED HYPERLIPIDEMIA TYPE: ICD-10-CM

## 2024-09-20 DIAGNOSIS — I10 PRIMARY HYPERTENSION: ICD-10-CM

## 2024-09-20 RX ORDER — SIMVASTATIN 40 MG/1
40 TABLET, FILM COATED ORAL NIGHTLY
Qty: 90 TABLET | Refills: 1 | Status: SHIPPED | OUTPATIENT
Start: 2024-09-20

## 2024-09-20 RX ORDER — LISINOPRIL 5 MG/1
5 TABLET ORAL DAILY
Qty: 90 TABLET | Refills: 1 | Status: SHIPPED | OUTPATIENT
Start: 2024-09-20

## 2024-09-24 ENCOUNTER — APPOINTMENT (OUTPATIENT)
Dept: PRIMARY CARE | Facility: CLINIC | Age: 84
End: 2024-09-24
Payer: MEDICARE

## 2024-10-04 DIAGNOSIS — N39.490 OVERFLOW INCONTINENCE OF URINE: ICD-10-CM

## 2024-10-04 RX ORDER — MIRABEGRON 50 MG/1
50 TABLET, FILM COATED, EXTENDED RELEASE ORAL DAILY
Qty: 90 TABLET | Refills: 1 | Status: SHIPPED | OUTPATIENT
Start: 2024-10-04

## 2024-10-28 DIAGNOSIS — B37.2 CUTANEOUS CANDIDIASIS: ICD-10-CM

## 2024-10-28 RX ORDER — CLOTRIMAZOLE AND BETAMETHASONE DIPROPIONATE 10; .64 MG/G; MG/G
CREAM TOPICAL 4 TIMES DAILY
Qty: 45 G | Refills: 3 | Status: SHIPPED | OUTPATIENT
Start: 2024-10-28 | End: 2025-10-28

## 2024-11-12 ENCOUNTER — APPOINTMENT (OUTPATIENT)
Dept: PRIMARY CARE | Facility: CLINIC | Age: 84
End: 2024-11-12
Payer: MEDICARE

## 2025-01-27 ENCOUNTER — APPOINTMENT (OUTPATIENT)
Dept: PRIMARY CARE | Facility: CLINIC | Age: 85
End: 2025-01-27
Payer: MEDICARE

## 2025-02-06 ENCOUNTER — APPOINTMENT (OUTPATIENT)
Dept: PRIMARY CARE | Facility: CLINIC | Age: 85
End: 2025-02-06
Payer: MEDICARE

## 2025-02-11 DIAGNOSIS — B37.2 CUTANEOUS CANDIDIASIS: ICD-10-CM

## 2025-02-18 RX ORDER — CLOTRIMAZOLE AND BETAMETHASONE DIPROPIONATE 10; .64 MG/G; MG/G
CREAM TOPICAL 4 TIMES DAILY
Qty: 45 G | Refills: 11 | Status: SHIPPED | OUTPATIENT
Start: 2025-02-18 | End: 2026-02-18

## 2025-03-06 ENCOUNTER — HOSPITAL ENCOUNTER (OUTPATIENT)
Dept: PHYSICAL THERAPY | Age: 85
Setting detail: THERAPIES SERIES
Discharge: HOME | End: 2025-03-06
Payer: MEDICARE

## 2025-03-06 PROCEDURE — 97110 THERAPEUTIC EXERCISES: CPT

## 2025-03-06 ASSESSMENT — PAIN SCALES - GENERAL: PAINLEVEL_OUTOF10: 2

## 2025-03-06 ASSESSMENT — PAIN DESCRIPTION - PAIN TYPE: TYPE: ACUTE PAIN

## 2025-03-06 ASSESSMENT — PAIN DESCRIPTION - LOCATION: LOCATION: LEG

## 2025-03-06 ASSESSMENT — PAIN DESCRIPTION - ORIENTATION: ORIENTATION: RIGHT

## 2025-03-06 ASSESSMENT — PAIN DESCRIPTION - DESCRIPTORS: DESCRIPTORS: ACHING

## 2025-03-06 NOTE — PROGRESS NOTES
Physical Therapy: Daily Note   Patient: Stone Moon (84 y.o. male)   Examination Date: 2025  Plan of Care/Certification Expiration Date: 25    No data recorded   :  1940 # of Visits since SOC:   2   MRN: [id number]  CSN: [id number] Start of Care Date:   2025   Insurance: Payor: [facility] MEDICARE / Plan: [facility] MEDICARE / Product Type: *No Product type* /   Insurance ID: [id number] - (Medicare Managed) Secondary Insurance (if applicable):    Referring Physician: Kay Rossi DO Elyse Tinker DO   PCP: Kay Rossi DO Visits to Date/Visits Approved: 2 / 10    No Show/Cancelled Appts: 0 /      Medical Diagnosis: Radiculopathy, lumbar region [M54.16]    Treatment Diagnosis: Low back pain / hip pain        SUBJECTIVE EXAMINATION   Pain Level: Pain Screening  Patient Currently in Pain: Yes  Pain Assessment: 0-10  Pain Level: 2  Pain Type: Acute pain  Pain Location: Leg  Pain Orientation: Right  Pain Descriptors: Aching    Patient Comments: Subjective: Pt reports having 2/10 right post thigh pain    HEP Compliance: Good        OBJECTIVE EXAMINATION   Restrictions:  No data recorded No data recorded No data recorded        TREATMENT     Exercises:      Treatment Reasoning    Exercise 1: Standing hip abduction x 10 B LE  Exercise 3: *Seated hamstring stretch x 3 H30  Exercise 4: Standing SLR x 10 B LE  Exercise 5: Standing hip extension x 10 B LE  Exercise 6: *SKTC x 3 L LE only due to having right knee pain due to a fall last week  Exercise 7: *Lateral hip stretch x 3 L LE only due to having right knee pain due to a fall last week                          Pt Education:  Given HEP        ASSESSMENT     Assessment: Assessment: Pt arrives to with reports of having right post thight pain. PT started session with a HS stretch. Pt then performed standing ther ex from her initial evaluation needing cues for form to how to modify if having pain. Pt was then instructed in supine stretches but

## 2025-03-12 ENCOUNTER — HOSPITAL ENCOUNTER (OUTPATIENT)
Dept: PHYSICAL THERAPY | Age: 85
Setting detail: THERAPIES SERIES
Discharge: HOME | End: 2025-03-12
Payer: MEDICARE

## 2025-03-12 PROCEDURE — 97110 THERAPEUTIC EXERCISES: CPT

## 2025-03-12 ASSESSMENT — PAIN SCALES - GENERAL: PAINLEVEL_OUTOF10: 2

## 2025-03-12 NOTE — PROGRESS NOTES
painful to stand on RLE for all 10 reps.  Pt. was also able to advance with further strengthening for core and hips with no inc in pain .Added supine hip flexor stretch as well to dec tightness. Pt. advised to use SPC as needed to help A with gait when having pain or to help with distance. Pt. denies pain post session.  Body Structures, Functions, Activity Limitations Requiring Skilled Therapeutic Intervention: Decreased functional mobility , Decreased ADL status, Decreased ROM, Decreased body mechanics, Decreased strength, Decreased coordination, Increased pain, Decreased balance    Post-Treatment Pain Level:  0    Activity Tolerance: Patient tolerated evaluation without incident    Therapy Prognosis: Good       GOALS   Patient Goals : pain free  Short Term Goals Completed by 2 weeks Current Status Goal Status   (I) and consistent with initial home exercise program 5/7 days per week   New   reduce pain in hip/back to 2/10 when sleeping (currently 4-5/10)   New   ambulate non antalgic gait short community distances   New   4+/5 R hip strength to improve gait pattern   New   reduce oswestry disability by 10%                                                   Long Term Goals Completed by 5 weeks Current Status Goal Status   (I) and consistent with HEP 6/7 days per week   New   full pain free AROM lumbar and hip   New   0/10 hip/back pain at rest, no more than 2/10 with activity and sleeping   New   ambulate with non antalgic gait (-) trendelenberg pattern   New   reduce oswestry disability by 25%                                                    TREATMENT PLAN   Plan Frequency: 2  Plan weeks: 5  Current Treatment Recommendations: Strengthening, ROM, Balance training, Functional mobility training, Transfer training, Endurance training, Neuromuscular re-education, Manual, Pain management, Home exercise program, Safety education & training           Therapy Time  Individual Time In:       845  Individual Time Out:

## 2025-03-14 DIAGNOSIS — I10 PRIMARY HYPERTENSION: ICD-10-CM

## 2025-03-14 RX ORDER — LISINOPRIL 5 MG/1
5 TABLET ORAL DAILY
Qty: 90 TABLET | Refills: 3 | Status: SHIPPED | OUTPATIENT
Start: 2025-03-14

## 2025-03-18 DIAGNOSIS — I10 PRIMARY HYPERTENSION: ICD-10-CM

## 2025-03-18 DIAGNOSIS — E78.5 HYPERLIPIDEMIA, UNSPECIFIED HYPERLIPIDEMIA TYPE: ICD-10-CM

## 2025-03-18 RX ORDER — SIMVASTATIN 40 MG/1
40 TABLET, FILM COATED ORAL NIGHTLY
Qty: 90 TABLET | Refills: 3 | Status: SHIPPED | OUTPATIENT
Start: 2025-03-18 | End: 2026-03-18

## 2025-03-18 RX ORDER — LISINOPRIL 5 MG/1
5 TABLET ORAL DAILY
Qty: 90 TABLET | Refills: 3 | Status: SHIPPED | OUTPATIENT
Start: 2025-03-18 | End: 2026-03-18

## 2025-03-19 ENCOUNTER — APPOINTMENT (OUTPATIENT)
Dept: PHYSICAL THERAPY | Age: 85
End: 2025-03-19
Payer: MEDICARE

## 2025-03-21 ENCOUNTER — HOSPITAL ENCOUNTER (OUTPATIENT)
Dept: PHYSICAL THERAPY | Age: 85
Setting detail: THERAPIES SERIES
Discharge: HOME | End: 2025-03-21
Payer: MEDICARE

## 2025-03-21 PROCEDURE — 97110 THERAPEUTIC EXERCISES: CPT

## 2025-03-21 PROCEDURE — 97116 GAIT TRAINING THERAPY: CPT

## 2025-03-21 ASSESSMENT — PAIN DESCRIPTION - DESCRIPTORS: DESCRIPTORS: ACHING;SORE

## 2025-03-21 ASSESSMENT — PAIN DESCRIPTION - LOCATION: LOCATION: LEG

## 2025-03-21 ASSESSMENT — PAIN SCALES - GENERAL: PAINLEVEL_OUTOF10: 2

## 2025-03-21 ASSESSMENT — PAIN DESCRIPTION - ORIENTATION: ORIENTATION: RIGHT

## 2025-03-21 ASSESSMENT — PAIN DESCRIPTION - PAIN TYPE: TYPE: ACUTE PAIN

## 2025-03-21 NOTE — PROGRESS NOTES
(-) trendelenberg pattern  New   reduce oswestry disability by 25%                                                                                    TREATMENT PLAN   Plan Frequency: 2  Plan weeks: 5  Current Treatment Recommendations: Strengthening, ROM, Balance training, Functional mobility training, Transfer training, Endurance training, Neuromuscular re-education, Manual, Pain management, Home exercise program, Safety education & training           Therapy Time  Individual Time In:       1015  Individual Time Out:  1100  Minutes:  45        Electronically signed by Avelina Marc PTA  on 3/21/2025 at 11:17 AM   POC NOTE

## 2025-04-11 DIAGNOSIS — I10 HYPERTENSION, UNSPECIFIED TYPE: ICD-10-CM

## 2025-04-11 RX ORDER — METOPROLOL SUCCINATE 50 MG/1
50 TABLET, EXTENDED RELEASE ORAL DAILY
Qty: 90 TABLET | Refills: 3 | Status: SHIPPED | OUTPATIENT
Start: 2025-04-11 | End: 2026-04-11

## 2025-04-28 ENCOUNTER — APPOINTMENT (OUTPATIENT)
Dept: PRIMARY CARE | Facility: CLINIC | Age: 85
End: 2025-04-28
Payer: MEDICARE

## 2025-04-28 VITALS
WEIGHT: 177.4 LBS | SYSTOLIC BLOOD PRESSURE: 150 MMHG | DIASTOLIC BLOOD PRESSURE: 86 MMHG | OXYGEN SATURATION: 97 % | HEART RATE: 65 BPM | HEIGHT: 63 IN | TEMPERATURE: 98 F | BODY MASS INDEX: 31.43 KG/M2 | RESPIRATION RATE: 18 BRPM

## 2025-04-28 DIAGNOSIS — Z13.220 SCREENING FOR LIPID DISORDERS: ICD-10-CM

## 2025-04-28 DIAGNOSIS — C50.119 MALIGNANT NEOPLASM OF CENTRAL PORTION OF FEMALE BREAST, UNSPECIFIED ESTROGEN RECEPTOR STATUS, UNSPECIFIED LATERALITY: ICD-10-CM

## 2025-04-28 DIAGNOSIS — E58 INADEQUATE INTAKE OF CALCIUM AND VITAMIN D: ICD-10-CM

## 2025-04-28 DIAGNOSIS — I10 PRIMARY HYPERTENSION: ICD-10-CM

## 2025-04-28 DIAGNOSIS — E55.9 INADEQUATE INTAKE OF CALCIUM AND VITAMIN D: ICD-10-CM

## 2025-04-28 DIAGNOSIS — H35.3213 EXUDATIVE AGE-RELATED MACULAR DEGENERATION, RIGHT EYE, WITH INACTIVE SCAR: ICD-10-CM

## 2025-04-28 DIAGNOSIS — Z13.29 SCREENING FOR THYROID DISORDER: ICD-10-CM

## 2025-04-28 DIAGNOSIS — C50.411 MALIGNANT NEOPLASM OF UPPER-OUTER QUADRANT OF RIGHT BREAST IN FEMALE, ESTROGEN RECEPTOR POSITIVE: ICD-10-CM

## 2025-04-28 DIAGNOSIS — Z13.89 SCREENING FOR BLOOD OR PROTEIN IN URINE: ICD-10-CM

## 2025-04-28 DIAGNOSIS — Z17.0 MALIGNANT NEOPLASM OF UPPER-OUTER QUADRANT OF RIGHT BREAST IN FEMALE, ESTROGEN RECEPTOR POSITIVE: ICD-10-CM

## 2025-04-28 DIAGNOSIS — Z00.00 ENCOUNTER FOR SUBSEQUENT ANNUAL WELLNESS VISIT (AWV) IN MEDICARE PATIENT: Primary | ICD-10-CM

## 2025-04-28 PROCEDURE — 99214 OFFICE O/P EST MOD 30 MIN: CPT | Performed by: INTERNAL MEDICINE

## 2025-04-28 PROCEDURE — G0444 DEPRESSION SCREEN ANNUAL: HCPCS | Performed by: INTERNAL MEDICINE

## 2025-04-28 PROCEDURE — 1123F ACP DISCUSS/DSCN MKR DOCD: CPT | Performed by: INTERNAL MEDICINE

## 2025-04-28 PROCEDURE — 1159F MED LIST DOCD IN RCRD: CPT | Performed by: INTERNAL MEDICINE

## 2025-04-28 PROCEDURE — G0136 PR ADMINISTRATION OF A STANDARDIZED, EVIDENCE-BASED SOCIAL DETERMINANTS OF HEALTH RISK ASSESSMENT TOOL, 5 TO 15 MINUTES: HCPCS | Performed by: INTERNAL MEDICINE

## 2025-04-28 PROCEDURE — 99497 ADVNCD CARE PLAN 30 MIN: CPT | Performed by: INTERNAL MEDICINE

## 2025-04-28 PROCEDURE — G2211 COMPLEX E/M VISIT ADD ON: HCPCS | Performed by: INTERNAL MEDICINE

## 2025-04-28 PROCEDURE — 1158F ADVNC CARE PLAN TLK DOCD: CPT | Performed by: INTERNAL MEDICINE

## 2025-04-28 PROCEDURE — 1170F FXNL STATUS ASSESSED: CPT | Performed by: INTERNAL MEDICINE

## 2025-04-28 PROCEDURE — 3077F SYST BP >= 140 MM HG: CPT | Performed by: INTERNAL MEDICINE

## 2025-04-28 PROCEDURE — 3079F DIAST BP 80-89 MM HG: CPT | Performed by: INTERNAL MEDICINE

## 2025-04-28 PROCEDURE — G0439 PPPS, SUBSEQ VISIT: HCPCS | Performed by: INTERNAL MEDICINE

## 2025-04-28 PROCEDURE — 99397 PER PM REEVAL EST PAT 65+ YR: CPT | Performed by: INTERNAL MEDICINE

## 2025-04-28 RX ORDER — VALSARTAN 80 MG/1
80 TABLET ORAL DAILY
Qty: 30 TABLET | Refills: 11 | Status: SHIPPED | OUTPATIENT
Start: 2025-04-28 | End: 2026-04-28

## 2025-04-28 ASSESSMENT — ACTIVITIES OF DAILY LIVING (ADL)
GROCERY_SHOPPING: INDEPENDENT
BATHING: INDEPENDENT
MANAGING_FINANCES: INDEPENDENT
DOING_HOUSEWORK: INDEPENDENT
TAKING_MEDICATION: INDEPENDENT
DRESSING: INDEPENDENT

## 2025-04-28 ASSESSMENT — PATIENT HEALTH QUESTIONNAIRE - PHQ9
2. FEELING DOWN, DEPRESSED OR HOPELESS: NOT AT ALL
1. LITTLE INTEREST OR PLEASURE IN DOING THINGS: NOT AT ALL
SUM OF ALL RESPONSES TO PHQ9 QUESTIONS 1 AND 2: 0

## 2025-04-30 NOTE — PROGRESS NOTES
Subjective     Patient ID: Dominick Peralta is a 84 y.o. female who presents for Medicare Annual Wellness Visit Subsequent.  History of Present Illness  Dominick Peralta is an 84 year old female who presents for an annual physical exam.    She experiences numbness in her legs and feet, along with weakness in her calves, leading to a 'wobbly' sensation when walking. These symptoms have been gradually worsening. She uses armrests to assist in getting up from a chair and occasionally uses a cane for stability, particularly in the morning. No lightheadedness is reported, but she feels wobbly. No pain in her calves, but weakness is noted.    She mentions a lump on the bottom of her foot, which was previously evaluated by a podiatrist. The podiatrist did not express concern, and she continues to have her toenails managed monthly by the podiatrist.    She has a history of breast cancer on the right side and was previously on lisinopril for blood pressure management, which caused a persistent dry cough. She was switched to valsartan but has not started it due to concerns about dizziness. Her blood pressure has been recorded at 150/80 mmHg. No current cough after discontinuing lisinopril.    She experiences dizziness when bending over, such as when pulling weeds, and attributes this to her knees hurting too much to kneel.    Her memory is reportedly good, though her caregiver notes some short-term memory slippage. She engages in word search puzzles and other activities to maintain cognitive function. No significant memory issues affecting daily life.    She has a history of urinary urgency, which has improved with the use of a 'pee pill' as needed. She also performs Kegel exercises to strengthen her pelvic floor. No urinary burning or urgency but notes improvement with medication.    She reports bruising easily, which she attributes to thin skin and lack of subcutaneous fat. No significant skin changes or concerns about  "malignancy.    Objective   Vitals:    04/28/25 0950   BP: 150/86   BP Location: Left arm   Patient Position: Sitting   BP Cuff Size: Adult   Pulse: 65   Resp: 18   Temp: 36.7 °C (98 °F)   TempSrc: Temporal   SpO2: 97%   Weight: 80.5 kg (177 lb 6.4 oz)   Height: 1.6 m (5' 3\")     Wt Readings from Last 10 Encounters:   04/28/25 80.5 kg (177 lb 6.4 oz)   04/24/24 83.5 kg (184 lb 2 oz)   01/30/24 80.6 kg (177 lb 12.8 oz)   01/03/24 80 kg (176 lb 6 oz)   11/21/23 79.3 kg (174 lb 12.8 oz)   06/30/23 81.9 kg (180 lb 8 oz)   04/28/23 80.7 kg (178 lb)   02/02/23 82.1 kg (181 lb)   01/23/23 80.3 kg (177 lb)   11/11/22 83.5 kg (184 lb)       Medication:  Current Outpatient Medications   Medication Instructions    anastrozole (ARIMIDEX) 1 mg, Daily    cholecalciferol (Vitamin D-3) 25 MCG (1000 UT) tablet Take by mouth.    clotrimazole-betamethasone (Lotrisone) cream Topical, 4 times daily    metoprolol succinate XL (TOPROL-XL) 50 mg, oral, Daily    simvastatin (ZOCOR) 40 mg, oral, Nightly    valsartan (DIOVAN) 80 mg, oral, Daily         Physical Exam  VITALS: BP- 150/80  NEUROLOGICAL: Gait issue present. Subtle findings on finger-to-nose test.  SKIN: Actinic keratosis on face, wart-like, non-malignant.      Physical Exam  Gen: Alert, awake, Oriented X 3. Not in any acute distress   HEENT:  Atraumatic, PERRL.  Conjunctivae clear.   Moist nasal mucous membranes. oropharynx non erythematous,   Neck:  Supple without thyromegaly or lymphadenopathy.  Lungs:  Clear to auscultation without rales, rhonchi, or rub.  Heart:  RRR, S1, S2, without M.  Abdomen:  Soft, non tender, no organ enlargement, bruit. Bowel sounds present . No CVA tenderness.  Extremities:  No edema. No calf swelling or tenderness.    Skin:  No rash, ecchymosis or erythema.      Review of Systems:  Constitutional:  No activity change or fever   HENT:  Denies ringing ears or nose bleed   Respiratory:  Denies stridor. No blood in sputum   Cardiovascular:  Denies chest " pain, no sudden excessive sweating   Gastrointestinal:  No sour burping, no blood in stool    Genitourinary:  Denies blood in urine    Musculoskeletal:  No joint redness or swelling    Skin:  No new spot changing color or shape or border    Neurological:  No speech difficulty, facial droop    Psychiatric/Behavioral:  No agitation, denies Hallucination     Recent Labs:   Lab Results   Component Value Date    WBC 7.3 05/07/2024    HGB 13.2 05/07/2024    HCT 40.8 05/07/2024     05/07/2024    CHOL 175 05/07/2024    TRIG 86 05/07/2024    HDL 62.2 05/07/2024    ALT 13 05/07/2024    AST 17 05/07/2024     05/07/2024    K 4.4 05/07/2024     05/07/2024    CREATININE 0.52 05/07/2024    BUN 17 05/07/2024    CO2 28 05/07/2024    TSH 1.44 05/07/2024     Lab Results   Component Value Date    GLUCOSE 91 05/07/2024    CALCIUM 9.7 05/07/2024     05/07/2024    K 4.4 05/07/2024    CO2 28 05/07/2024     05/07/2024    BUN 17 05/07/2024    CREATININE 0.52 05/07/2024      Lab Results   Component Value Date    LDLCALC 96 05/07/2024     Lab Results   Component Value Date    LDLCALC 96 05/07/2024    CREATININE 0.52 05/07/2024         Diagnosis and Orders:   Encounter for subsequent annual wellness visit (AWV) in Medicare patient  Exudative age-related macular degeneration, right eye, with inactive scar  Malignant neoplasm of upper-outer quadrant of right breast in female, estrogen receptor positive  -     Comprehensive Metabolic Panel; Future  -     Disability Placard  Malignant neoplasm of central portion of female breast, unspecified estrogen receptor status, unspecified laterality  -     CBC and Auto Differential; Future  Primary hypertension  -     valsartan (Diovan) 80 mg tablet; Take 1 tablet (80 mg) by mouth once daily.  Screening for lipid disorders  -     Lipid Panel; Future  Screening for thyroid disorder  -     Triiodothyronine, Free; Future  -     TSH with reflex to Free T4 if abnormal;  Future  Inadequate intake of calcium and vitamin D  -     Vitamin D 25-Hydroxy,Total (for eval of Vitamin D levels); Future  Screening for blood or protein in urine  -     Urinalysis with Reflex Microscopic; Future         Assessment & Plan  Early Parkinson's disease  Symptoms include gait issues and calf weakness. Symptoms are subtle and not severe enough to warrant medication. Discussed disease progression and the 33% rule: 33% remain the same, 33% improve, and 33% worsen. Medication will be considered if symptoms become unmanageable.  - Monitor symptoms and adjust lifestyle to maintain functionality.  - Consider neurology referral if symptoms worsen or become unmanageable.    Essential (primary) hypertension  Previously on lisinopril, discontinued due to dry cough. Valsartan prescribed as an alternative but not yet taken due to dizziness concerns. Blood pressure at 150/80 mmHg, elevated but below stroke risk threshold of 160 mmHg. Valsartan may lower blood pressure excessively if not monitored.  - Initiate valsartan in the morning, separate from metoprolol taken at night.  - Monitor blood pressure closely. If systolic pressure consistently falls below 100 mmHg, reduce valsartan dose by half.  - Educated on potential side effects and importance of monitoring blood pressure.    Short-term memory loss  Mild short-term memory loss with no significant impact on daily activities. Engages in cognitive stimulation activities.  - Encourage cognitive exercises such as crossword puzzles and memorization activities.    Senile purpura  Bruising on arms due to senile purpura from thinning skin and loss of subcutaneous fat. Bruising is benign and not indicative of underlying pathology.  - Reassured that bruising is benign and related to age-related skin changes.    Wellness Visit  Annual wellness visit conducted. Memory is reportedly good with some short-term memory slipping. No significant issues with mental health, dental  health, or skin infections. Bruising on arms attributed to senile purpura. No significant urinary symptoms. Adjusting lifestyle for early Parkinson's disease.  - Order annual blood work for 2025. Ensure fasting before the test.  - Encourage continued use of Kegel exercises for pelvic floor strengthening.  - Advise on lifestyle adjustments for early Parkinson's disease, such as using a cane with four prongs for stability.    Goals of Care  Discussed advanced directives and end-of-life care preferences. Confirmed all necessary paperwork, including power of , is in place.  - Ensure all advanced directives and end-of-life care preferences are documented and accessible.      VISIT SUMMARY:  Today, you came in for your annual physical exam. We discussed several health concerns, including numbness and weakness in your legs, a lump on your foot, your blood pressure, memory, and bruising. We also reviewed your history of breast cancer and your current medications. Additionally, we talked about your advanced directives and end-of-life care preferences.    YOUR PLAN:  -EARLY PARKINSON'S DISEASE: Early Parkinson's disease is a condition that affects movement, causing symptoms like gait issues and muscle weakness. Your symptoms are currently mild, so we will monitor them and adjust your lifestyle to help maintain your functionality. If your symptoms worsen, we may consider referring you to a neurologist.    -ESSENTIAL (PRIMARY) HYPERTENSION: Essential hypertension is high blood pressure with no identifiable cause. You were previously on lisinopril, which caused a dry cough, so we switched you to valsartan. Start taking valsartan in the morning and monitor your blood pressure closely. If your systolic pressure consistently falls below 100 mmHg, reduce the dose by half. Be aware of potential side effects and keep track of your blood pressure.    -SHORT-TERM MEMORY LOSS: You have mild short-term memory loss that does not  significantly impact your daily activities. Continue engaging in cognitive exercises like crossword puzzles and memorization activities to help maintain your cognitive function.    -SENILE PURPURA: Senile purpura is a condition where the skin bruises easily due to thinning and loss of subcutaneous fat. The bruising on your arms is benign and related to age-related skin changes. There is no underlying pathology to be concerned about.    -WELLNESS VISIT: During your annual wellness visit, we reviewed your overall health, including memory, mental health, dental health, and skin. We also discussed your urinary symptoms and lifestyle adjustments for early Parkinson's disease. Continue using Kegel exercises to strengthen your pelvic floor and consider using a cane with four prongs for better stability.    -GOALS OF CARE: We discussed your advanced directives and end-of-life care preferences. All necessary paperwork, including power of , is in place. Ensure these documents are accessible.    INSTRUCTIONS:  Please start taking valsartan in the morning and monitor your blood pressure closely. If your systolic pressure consistently falls below 100 mmHg, reduce the dose by half. Continue with your cognitive exercises and Kegel exercises. We will order your annual blood work for 2025; please ensure you fast before the test. If your symptoms of Parkinson's disease worsen, we may consider a referral to a neurologist. Make sure all your advanced directives and end-of-life care documents are accessible.        This medical note was created with the assistance of artificial intelligence (AI) for documentation purposes. The content has been reviewed and confirmed by the healthcare provider for accuracy and completeness. Patient consented to the use of audio recording and use of AI during their visit.

## 2025-05-08 ENCOUNTER — APPOINTMENT (OUTPATIENT)
Dept: PRIMARY CARE | Facility: CLINIC | Age: 85
End: 2025-05-08
Payer: MEDICARE

## 2025-05-18 LAB
NON-UH HIE ANION GAP:SCNC:PT:SER/PLAS:QN:: 12 MEQ/L (ref 6–16)
NON-UH HIE BASOPHILS/LEUKOCYTES:NFR.DF:PT:BLD:QN:AUTOMATED COUNT: 0.1 E9/L (ref 0–0.2)
NON-UH HIE BASOPHILS:NCNC:PT:BLD:QN:AUTOMATED COUNT: 1.5 % (ref 0–2)
NON-UH HIE CALCIUM:MCNC:PT:SER/PLAS:QN:: 9.1 MG/DL (ref 8.9–11.1)
NON-UH HIE CARBON DIOXIDE:SCNC:PT:SER/PLAS:QN:: 27 MMOL/L (ref 21–31)
NON-UH HIE CHLORIDE:SCNC:PT:SER/PLAS:QN:: 106 MMOL/L (ref 101–111)
NON-UH HIE CREATININE:MCNC:PT:SER/PLAS:QN:: 0.5 MG/DL (ref 0.5–1.3)
NON-UH HIE EGFR: 91 ML/MIN/1.73 M2
NON-UH HIE EOSINOPHILS/100 LEUKOCYTES:NFR:PT:BLD:QN:AUTOMATED COUNT: 2 % (ref 0–8)
NON-UH HIE EOSINOPHILS:NCNC:PT:BLD:QN:: 0.2 E9/L (ref 0–0.5)
NON-UH HIE ERYTHROCYTE DISTRIBUTION WIDTH:RATIO:PT:RBC:QN:AUTOMATED COUNT: 14.7 % (ref 10.9–14.2)
NON-UH HIE ERYTHROCYTE MEAN CORPUSCULAR HEMOGLOBIN CONCENTRATION:MCNC:PT:RB: 33.6 GM/DL (ref 31.4–36)
NON-UH HIE ERYTHROCYTE MEAN CORPUSCULAR HEMOGLOBIN:ENTMASS:PT:RBC:QN:AUTOMA: 29.9 PG (ref 27–34)
NON-UH HIE ERYTHROCYTE MEAN CORPUSCULAR VOLUME:ENTVOL:PT:RBC:QN:AUTOMATED C: 88.8 FL (ref 80–100)
NON-UH HIE ERYTHROCYTES:NCNC:PT:BLD:QN:AUTOMATED COUNT: 4.5 E12/L (ref 4.3–5.9)
NON-UH HIE GLUCOSE:MCNC:PT:SER/PLAS:QN:: 94 MG/DL (ref 55–199)
NON-UH HIE HEMATOCRIT:VFR:PT:BLD:QN:AUTOMATED COUNT: 40.3 % (ref 34–46)
NON-UH HIE HEMOGLOBIN:MCNC:PT:BLD:QN:: 13.5 GM/DL (ref 12–16)
NON-UH HIE LEUKOCYTES: 8.6 E9/L (ref 4–11)
NON-UH HIE LYMPHOCYTES:NCNC:PT:BLD:QN:: 2.5 E9/L (ref 1–4)
NON-UH HIE LYMPHOCYTES:NCNC:PT:BLD:QN:AUTOMATED COUNT: 29.1 % (ref 14–50)
NON-UH HIE MONOCYTES:NCNC:PT:BLD:QN:AUTOMATED COUNT: 1 E9/L (ref 0.2–1)
NON-UH HIE NATRIURETIC PEPTIDE.B:MCNC:PT:SER/PLAS:QN:: 110 PG/ML (ref 5–80)
NON-UH HIE NEUTROPHILS/100 LEUKOCYTES:NFR:PT:BLD:QN:: 56.1 % (ref 36–75)
NON-UH HIE NEUTROPHILS:NCNC:PT:BLD:QN:AUTOMATED COUNT: 4.8 E9/L (ref 2–7.5)
NON-UH HIE PLATELET MEAN VOLUME:ENTVOL:PT:BLD:QN:AUTOMATED COUNT: 8.3 FL (ref 6.4–10.8)
NON-UH HIE PLATELET: 235 E9/L (ref 150–500)
NON-UH HIE POTASSIUM:SCNC:PT:SER/PLAS:QN:: 3.9 MMOL/L (ref 3.5–5.3)
NON-UH HIE SODIUM:SCNC:PT:SER/PLAS:QN:: 141 MMOL/L (ref 135–145)
NON-UH HIE TROPONIN: 4.7 PG/ML (ref 10.1–27.1)
NON-UH HIE TROPONIN: 4.8 PG/ML (ref 10.1–27.1)
NON-UH HIE TUBE COLLECTED PLASMA: YES
NON-UH HIE UREA NITROGEN/CREATININE:MRTO:PT:SER/PLAS:QN:: 36 NO UNITS (ref 10–20)
NON-UH HIE UREA NITROGEN:MCNC:PT:SER/PLAS:QN:: 18 MG/DL (ref 5–21)

## 2025-05-21 ENCOUNTER — OFFICE VISIT (OUTPATIENT)
Dept: PRIMARY CARE | Facility: CLINIC | Age: 85
End: 2025-05-21
Payer: MEDICARE

## 2025-05-21 VITALS
WEIGHT: 175.8 LBS | OXYGEN SATURATION: 95 % | BODY MASS INDEX: 31.15 KG/M2 | HEIGHT: 63 IN | DIASTOLIC BLOOD PRESSURE: 88 MMHG | SYSTOLIC BLOOD PRESSURE: 132 MMHG | HEART RATE: 66 BPM | TEMPERATURE: 98.2 F

## 2025-05-21 DIAGNOSIS — E11.49 TYPE II DIABETES MELLITUS WITH NEUROLOGICAL MANIFESTATIONS (MULTI): ICD-10-CM

## 2025-05-21 DIAGNOSIS — C50.011 MALIGNANT NEOPLASM OF NIPPLE OF RIGHT BREAST IN FEMALE, UNSPECIFIED ESTROGEN RECEPTOR STATUS: ICD-10-CM

## 2025-05-21 DIAGNOSIS — R60.0 LOCALIZED EDEMA: Primary | ICD-10-CM

## 2025-05-21 DIAGNOSIS — C50.411 MALIGNANT NEOPLASM OF UPPER-OUTER QUADRANT OF RIGHT FEMALE BREAST, UNSPECIFIED ESTROGEN RECEPTOR STATUS: ICD-10-CM

## 2025-05-21 DIAGNOSIS — Z09 HOSPITAL DISCHARGE FOLLOW-UP: ICD-10-CM

## 2025-05-21 PROCEDURE — G2211 COMPLEX E/M VISIT ADD ON: HCPCS | Performed by: INTERNAL MEDICINE

## 2025-05-21 PROCEDURE — 3075F SYST BP GE 130 - 139MM HG: CPT | Performed by: INTERNAL MEDICINE

## 2025-05-21 PROCEDURE — 99214 OFFICE O/P EST MOD 30 MIN: CPT | Performed by: INTERNAL MEDICINE

## 2025-05-21 PROCEDURE — 3079F DIAST BP 80-89 MM HG: CPT | Performed by: INTERNAL MEDICINE

## 2025-05-21 PROCEDURE — 1124F ACP DISCUSS-NO DSCNMKR DOCD: CPT | Performed by: INTERNAL MEDICINE

## 2025-05-21 PROCEDURE — 1159F MED LIST DOCD IN RCRD: CPT | Performed by: INTERNAL MEDICINE

## 2025-05-21 RX ORDER — FUROSEMIDE 40 MG/1
40 TABLET ORAL
Qty: 15 TABLET | Refills: 0 | Status: SHIPPED | OUTPATIENT
Start: 2025-05-21 | End: 2026-05-21

## 2025-05-21 ASSESSMENT — PATIENT HEALTH QUESTIONNAIRE - PHQ9
1. LITTLE INTEREST OR PLEASURE IN DOING THINGS: NOT AT ALL
2. FEELING DOWN, DEPRESSED OR HOPELESS: NOT AT ALL
SUM OF ALL RESPONSES TO PHQ9 QUESTIONS 1 AND 2: 0

## 2025-05-22 NOTE — PROGRESS NOTES
"Subjective     Patient ID: Dominick Peralta is a 84 y.o. female who presents for Swelling Feet & Ankles (Onset: 5-).  History of Present Illness  The patient presents for evaluation of bilateral ankle swelling.    She experienced significant bilateral ankle and foot swelling on Sunday, 05/18/2025, accompanied by tingling sensations. This occurred without any prolonged standing or unusual activities. She sought emergency care at [facility], where blood tests were conducted, yielding normal results. The possibility of congestive heart failure was considered, but she has no prior diagnosis of this condition and has not previously consulted a cardiologist. She reports no shortness of breath and sleeps comfortably with one pillow. She was advised to use compression stockings, which she wore for approximately 3 hours daily. However, she found them too tight and uncomfortable, leading to their removal. She has been consuming a fair amount of bananas. She underwent a vein procedure approximately 4 to 5 years ago. She was prescribed Lasix, taking one dose each on Monday, Tuesday, and today, which resulted in a reduction of the swelling.    Her blood pressure was high during her last visit, but it is now coming down nicely with valsartan.    PAST SURGICAL HISTORY:  Vein procedure approximately 4 to 5 years ago.    Objective   Vitals:    05/21/25 1403   BP: 132/88   BP Location: Left arm   Patient Position: Sitting   BP Cuff Size: Adult   Pulse: 66   Temp: 36.8 °C (98.2 °F)   TempSrc: Temporal   SpO2: 95%   Weight: 79.7 kg (175 lb 12.8 oz)   Height: 1.6 m (5' 3\")     Wt Readings from Last 10 Encounters:   05/21/25 79.7 kg (175 lb 12.8 oz)   04/28/25 80.5 kg (177 lb 6.4 oz)   04/24/24 83.5 kg (184 lb 2 oz)   01/30/24 80.6 kg (177 lb 12.8 oz)   01/03/24 80 kg (176 lb 6 oz)   11/21/23 79.3 kg (174 lb 12.8 oz)   06/30/23 81.9 kg (180 lb 8 oz)   04/28/23 80.7 kg (178 lb)   02/02/23 82.1 kg (181 lb)   01/23/23 80.3 kg (177 lb) "       Medication:  Current Outpatient Medications   Medication Instructions    anastrozole (ARIMIDEX) 1 mg, Daily    cholecalciferol (VITAMIN D-3) 25 mcg, Daily    clotrimazole-betamethasone (Lotrisone) cream Topical, 4 times daily    furosemide (LASIX) 40 mg, oral, Every Mon/Wed/Fri    metoprolol succinate XL (TOPROL-XL) 50 mg, oral, Daily    simvastatin (ZOCOR) 40 mg, oral, Nightly    valsartan (DIOVAN) 80 mg, oral, Daily         Physical Exam  Respiratory: Clear to auscultation, no wheezing, rales or rhonchi  Extremities: Bilateral ankle and foot swelling noted  Gen:  Not in any acute distress   HE ENT: No pallor, No carotid bruit,  No thyromegaly   Heart:  RRR, S1, S2, No murmur   Abd: No epigastric tenderness, No CVA tenderness   Skin:  No rash, ecchymosis or erythema.      Review of Systems:  Respiratory:  Denies stridor. No blood in sputum   Cardiovascular:  Denies chest pain, no sudden excessive sweating   Gastrointestinal:  Denies sour burping, no blood in stool    Genitourinary:  Denies blood in urine    Skin:  No new spot changing color or shape or border    Neurological: Denies speech difficulty, no memory disturbance        Recent Labs:   Lab Results   Component Value Date    WBC 7.3 05/07/2024    HGB 13.2 05/07/2024    HCT 40.8 05/07/2024     05/07/2024    CHOL 175 05/07/2024    TRIG 86 05/07/2024    HDL 62.2 05/07/2024    ALT 13 05/07/2024    AST 17 05/07/2024     05/07/2024    K 4.4 05/07/2024     05/07/2024    CREATININE 0.52 05/07/2024    BUN 17 05/07/2024    CO2 28 05/07/2024    TSH 1.44 05/07/2024     Lab Results   Component Value Date    GLUCOSE 91 05/07/2024    CALCIUM 9.7 05/07/2024     05/07/2024    K 4.4 05/07/2024    CO2 28 05/07/2024     05/07/2024    BUN 17 05/07/2024    CREATININE 0.52 05/07/2024      Lab Results   Component Value Date    LDLCALC 96 05/07/2024       Lab Results   Component Value Date    LDLCALC 96 05/07/2024    CREATININE 0.52 05/07/2024        Diagnosis and Orders:     Localized edema  -     furosemide (Lasix) 40 mg tablet;   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 Venous insufficiency.  - The tingling sensation in her feet is attributed to water pressure exerting on her nerves and arteries, leading to numbness.  - This is not indicative of heart failure or kidney disease.  - She is advised to wear compression stockings during the day, especially when standing, and remove them at night.  - A prescription for 15 tablets of Lasix will be provided, to be taken as needed. She should take the medication first thing in the morning after emptying her bladder, then lie flat for an hour with her feet elevated above her heart. Dietary modifications include reducing salt intake and increasing consumption of potassium-rich foods such as bananas, tomatoes, and mushrooms.    2. Blood pressure management.  - Her blood pressure is coming down nicely with valsartan.  - Blood pressure readings have shown improvement.  - Valsartan is deemed effective in managing her blood pressure.  - Continue current valsartan dosage and monitor blood pressure regularly.              This medical note was created with the assistance of artificial intelligence (AI) for documentation purposes. The content has been reviewed and confirmed by the healthcare provider for accuracy and completeness. Patient consented to the use of audio recording and use of AI during their visit.

## 2025-06-12 ENCOUNTER — APPOINTMENT (OUTPATIENT)
Dept: CARDIOLOGY | Facility: CLINIC | Age: 85
End: 2025-06-12
Payer: MEDICARE

## 2025-08-25 ENCOUNTER — APPOINTMENT (OUTPATIENT)
Dept: PRIMARY CARE | Facility: CLINIC | Age: 85
End: 2025-08-25
Payer: MEDICARE

## 2025-08-25 VITALS
OXYGEN SATURATION: 98 % | HEART RATE: 63 BPM | TEMPERATURE: 97.9 F | SYSTOLIC BLOOD PRESSURE: 132 MMHG | BODY MASS INDEX: 32.04 KG/M2 | HEIGHT: 63 IN | WEIGHT: 180.8 LBS | DIASTOLIC BLOOD PRESSURE: 70 MMHG

## 2025-08-25 DIAGNOSIS — B35.3 TINEA PEDIS OF BOTH FEET: Primary | ICD-10-CM

## 2025-08-25 PROCEDURE — G2211 COMPLEX E/M VISIT ADD ON: HCPCS | Performed by: INTERNAL MEDICINE

## 2025-08-25 PROCEDURE — 3075F SYST BP GE 130 - 139MM HG: CPT | Performed by: INTERNAL MEDICINE

## 2025-08-25 PROCEDURE — 99214 OFFICE O/P EST MOD 30 MIN: CPT | Performed by: INTERNAL MEDICINE

## 2025-08-25 PROCEDURE — 3078F DIAST BP <80 MM HG: CPT | Performed by: INTERNAL MEDICINE

## 2025-08-25 PROCEDURE — 1159F MED LIST DOCD IN RCRD: CPT | Performed by: INTERNAL MEDICINE

## 2025-08-25 RX ORDER — DOXYLAMINE SUCCINATE 25 MG
TABLET ORAL 3 TIMES DAILY
Qty: 92 G | Refills: 2 | Status: SHIPPED | OUTPATIENT
Start: 2025-08-25

## 2025-08-25 RX ORDER — FLUOCINONIDE 0.05 MG/G
OINTMENT TOPICAL 3 TIMES DAILY
Qty: 60 G | Refills: 11 | Status: SHIPPED | OUTPATIENT
Start: 2025-08-25 | End: 2026-08-25

## 2025-09-24 ENCOUNTER — APPOINTMENT (OUTPATIENT)
Dept: PRIMARY CARE | Facility: CLINIC | Age: 85
End: 2025-09-24
Payer: MEDICARE

## 2025-09-26 ENCOUNTER — APPOINTMENT (OUTPATIENT)
Dept: PRIMARY CARE | Facility: CLINIC | Age: 85
End: 2025-09-26
Payer: MEDICARE